# Patient Record
Sex: MALE | Race: WHITE | NOT HISPANIC OR LATINO | Employment: OTHER | URBAN - METROPOLITAN AREA
[De-identification: names, ages, dates, MRNs, and addresses within clinical notes are randomized per-mention and may not be internally consistent; named-entity substitution may affect disease eponyms.]

---

## 2017-02-09 ENCOUNTER — IMPORTED ENCOUNTER (OUTPATIENT)
Dept: URBAN - METROPOLITAN AREA CLINIC 43 | Facility: CLINIC | Age: 72
End: 2017-02-09

## 2017-02-09 PROBLEM — H16.223: Noted: 2017-02-09

## 2017-02-09 PROBLEM — H40.053: Noted: 2017-02-09

## 2017-06-15 ENCOUNTER — IMPORTED ENCOUNTER (OUTPATIENT)
Dept: URBAN - METROPOLITAN AREA CLINIC 43 | Facility: CLINIC | Age: 72
End: 2017-06-15

## 2017-06-15 PROBLEM — H43.813: Noted: 2017-06-15

## 2017-06-15 PROBLEM — H35.363: Noted: 2017-06-15

## 2017-06-15 PROBLEM — H40.053: Noted: 2017-06-15

## 2017-06-15 PROBLEM — E11.9: Noted: 2017-06-15

## 2017-06-28 ENCOUNTER — IMPORTED ENCOUNTER (OUTPATIENT)
Dept: URBAN - METROPOLITAN AREA CLINIC 43 | Facility: CLINIC | Age: 72
End: 2017-06-28

## 2019-01-04 ENCOUNTER — IMPORTED ENCOUNTER (OUTPATIENT)
Dept: URBAN - METROPOLITAN AREA CLINIC 43 | Facility: CLINIC | Age: 74
End: 2019-01-04

## 2019-01-04 PROBLEM — H40.053: Noted: 2019-01-04

## 2019-07-12 ENCOUNTER — IMPORTED ENCOUNTER (OUTPATIENT)
Dept: URBAN - METROPOLITAN AREA CLINIC 43 | Facility: CLINIC | Age: 74
End: 2019-07-12

## 2019-07-12 PROBLEM — H40.053: Noted: 2019-07-12

## 2020-04-19 ASSESSMENT — KERATOMETRY
OS_K1POWER_DIOPTERS: 41.75
OD_K2POWER_DIOPTERS: 41
OD_K1POWER_DIOPTERS: 41.5
OS_K2POWER_DIOPTERS: 41
OD_AXISANGLE_DEGREES: 165
OS_AXISANGLE2_DEGREES: 85
OD_K2POWER_DIOPTERS: 41.25
OS_AXISANGLE_DEGREES: 180
OD_AXISANGLE2_DEGREES: 75
OS_K2POWER_DIOPTERS: 41
OS_AXISANGLE_DEGREES: 175
OD_AXISANGLE2_DEGREES: 105
OS_K1POWER_DIOPTERS: 41.5
OS_AXISANGLE2_DEGREES: 90
OD_K1POWER_DIOPTERS: 41.25
OD_AXISANGLE_DEGREES: 15

## 2020-04-19 ASSESSMENT — TONOMETRY
OS_IOP_MMHG: 14.0
OD_IOP_MMHG: 20.0
OD_IOP_MMHG: 16.0
OD_IOP_MMHG: 14.0
OS_IOP_MMHG: 21.0
OD_IOP_MMHG: 15.0
OS_IOP_MMHG: 17.0
OS_IOP_MMHG: 25.0
OS_IOP_MMHG: 15.0

## 2020-04-19 ASSESSMENT — VISUAL ACUITY
OS_SC: J7
OS_SC: 20/25
OD_SC: J7
OD_SC: 20/30 +2
OD_SC: 20/20-1
OS_SC: 20/25
OD_SC: 20/20-2
OS_SC: 20/25 +2
OS_SC: 20/20-2
OD_SC: 20/25-1
OS_CC: J1+
OD_SC: 20/40 +1
OS_SC: 20/20-1
OS_SC: 20/25-2
OD_CC: J1+2

## 2021-11-10 ENCOUNTER — ESTABLISHED COMPREHENSIVE EXAM (OUTPATIENT)
Dept: URBAN - METROPOLITAN AREA CLINIC 32 | Facility: CLINIC | Age: 76
End: 2021-11-10

## 2021-11-10 DIAGNOSIS — H43.813: ICD-10-CM

## 2021-11-10 DIAGNOSIS — H35.3131: ICD-10-CM

## 2021-11-10 DIAGNOSIS — H40.053: ICD-10-CM

## 2021-11-10 DIAGNOSIS — E11.9: ICD-10-CM

## 2021-11-10 PROCEDURE — 92015 DETERMINE REFRACTIVE STATE: CPT

## 2021-11-10 PROCEDURE — 92133 CPTRZD OPH DX IMG PST SGM ON: CPT

## 2021-11-10 PROCEDURE — 92014 COMPRE OPH EXAM EST PT 1/>: CPT

## 2021-11-10 ASSESSMENT — VISUAL ACUITY
OS_GLARE: 20/50
OS_SC: J7
OS_SC: 20/20-1
OD_SC: 20/25-2
OS_CC: J1+
OD_SC: J5
OD_CC: J1+
OD_GLARE: 20/60

## 2021-11-10 ASSESSMENT — TONOMETRY
OS_IOP_MMHG: 14
OD_IOP_MMHG: 14

## 2021-11-10 ASSESSMENT — KERATOMETRY
OS_K1POWER_DIOPTERS: 41.00
OD_K1POWER_DIOPTERS: 41.50
OD_K2POWER_DIOPTERS: 41.25
OS_AXISANGLE2_DEGREES: 90
OD_AXISANGLE_DEGREES: 100
OS_K2POWER_DIOPTERS: 41.25
OS_AXISANGLE_DEGREES: 180
OD_AXISANGLE2_DEGREES: 10

## 2022-02-03 ENCOUNTER — NEW PATIENT (OUTPATIENT)
Dept: URBAN - METROPOLITAN AREA CLINIC 33 | Facility: CLINIC | Age: 77
End: 2022-02-03

## 2022-02-03 DIAGNOSIS — H35.3132: ICD-10-CM

## 2022-02-03 DIAGNOSIS — H43.813: ICD-10-CM

## 2022-02-03 DIAGNOSIS — H40.053: ICD-10-CM

## 2022-02-03 DIAGNOSIS — H04.129: ICD-10-CM

## 2022-02-03 DIAGNOSIS — H35.373: ICD-10-CM

## 2022-02-03 PROCEDURE — 99204 OFFICE O/P NEW MOD 45 MIN: CPT

## 2022-02-03 PROCEDURE — 92134 CPTRZ OPH DX IMG PST SGM RTA: CPT

## 2022-02-03 PROCEDURE — 92250 FUNDUS PHOTOGRAPHY W/I&R: CPT

## 2022-02-03 ASSESSMENT — VISUAL ACUITY
OD_SC: 20/40-2
OS_SC: 20/25-2

## 2022-02-03 ASSESSMENT — TONOMETRY
OD_IOP_MMHG: 15
OS_IOP_MMHG: 15

## 2022-06-04 ENCOUNTER — TELEPHONE ENCOUNTER (OUTPATIENT)
Dept: URBAN - METROPOLITAN AREA CLINIC 68 | Facility: CLINIC | Age: 77
End: 2022-06-04

## 2022-06-04 RX ORDER — POLYETHYLENE GLYCOL 3350, SODIUM SULFATE, SODIUM CHLORIDE, POTASSIUM CHLORIDE, ASCORBIC ACID, SODIUM ASCORBATE 7.5-2.691G
KIT ORAL AS DIRECTED
Qty: 1 | Refills: 0 | OUTPATIENT
Start: 2014-10-25 | End: 2014-10-26

## 2022-06-05 ENCOUNTER — TELEPHONE ENCOUNTER (OUTPATIENT)
Dept: URBAN - METROPOLITAN AREA CLINIC 68 | Facility: CLINIC | Age: 77
End: 2022-06-05

## 2022-06-05 RX ORDER — HYDROCORTISONE ACETATE 0.5 %
GLUCOSAMINE CHONDR 1500 COMPLX(  ORAL  DAILY ) ACTIVE -HX ENTRY CREAM (GRAM) TOPICAL DAILY
Status: ACTIVE | COMMUNITY
Start: 2014-10-22

## 2022-06-05 RX ORDER — CARVEDILOL 12.5 MG/1
CARVEDILOL( 12.5MG ORAL  TWO TIMES DAILY ) ACTIVE -HX ENTRY TABLET, FILM COATED ORAL
Status: ACTIVE | COMMUNITY
Start: 2014-10-22

## 2022-06-05 RX ORDER — POLYETHYLENE GLYCOL 3350 17 G/DOSE
MIRALAX(  ORAL  DAILY ) ACTIVE -HX ENTRY POWDER (GRAM) ORAL DAILY
Status: ACTIVE | COMMUNITY
Start: 2014-10-22

## 2022-06-05 RX ORDER — LEVOTHYROXINE SODIUM 0.03 MG/1
LEVOTHYROXINE SODIUM( 25MCG ORAL  DAILY ) ACTIVE -HX ENTRY TABLET ORAL DAILY
Status: ACTIVE | COMMUNITY
Start: 2014-10-22

## 2022-06-05 RX ORDER — GABAPENTIN 600 MG/1
GABAPENTIN( 600MG ORAL  FOUR TIMES DAILY ) ACTIVE -HX ENTRY TABLET, FILM COATED ORAL
Status: ACTIVE | COMMUNITY
Start: 2014-10-22

## 2022-06-05 RX ORDER — METFORMIN HYDROCHLORIDE 750 MG/1
METFORMIN HCL ER( 750MG ORAL  TWO TIMES DAILY ) ACTIVE -HX ENTRY TABLET, EXTENDED RELEASE ORAL
Status: ACTIVE | COMMUNITY
Start: 2014-10-22

## 2022-06-05 RX ORDER — SIMVASTATIN 20 MG/1
SIMVASTATIN( 20MG ORAL  DAILY ) ACTIVE -HX ENTRY TABLET, FILM COATED ORAL DAILY
Status: ACTIVE | COMMUNITY
Start: 2014-10-22

## 2022-06-05 RX ORDER — DOCUSATE SODIUM 100 MG/1
STOOL SOFTENER( 100MG ORAL 2 DAILY ) ACTIVE -HX ENTRY CAPSULE, LIQUID FILLED ORAL DAILY
Status: ACTIVE | COMMUNITY
Start: 2014-10-22

## 2022-06-05 RX ORDER — LOSARTAN POTASSIUM 25 MG/1
LOSARTAN POTASSIUM( 25MG ORAL  DAILY ) ACTIVE -HX ENTRY TABLET ORAL DAILY
Status: ACTIVE | COMMUNITY
Start: 2014-10-22

## 2022-06-25 ENCOUNTER — TELEPHONE ENCOUNTER (OUTPATIENT)
Age: 77
End: 2022-06-25

## 2022-06-25 RX ORDER — POLYETHYLENE GLYCOL 3350, SODIUM SULFATE, SODIUM CHLORIDE, POTASSIUM CHLORIDE, ASCORBIC ACID, SODIUM ASCORBATE 7.5-2.691G
KIT ORAL AS DIRECTED
Qty: 1 | Refills: 0 | OUTPATIENT
Start: 2014-10-25 | End: 2014-10-26

## 2022-06-26 ENCOUNTER — TELEPHONE ENCOUNTER (OUTPATIENT)
Age: 77
End: 2022-06-26

## 2022-06-26 RX ORDER — GABAPENTIN 600 MG/1
GABAPENTIN( 600MG ORAL  FOUR TIMES DAILY ) ACTIVE -HX ENTRY TABLET ORAL
Status: ACTIVE | COMMUNITY
Start: 2014-10-22

## 2022-06-26 RX ORDER — DOCUSATE SODIUM 100 MG/1
STOOL SOFTENER( 100MG ORAL 2 DAILY ) ACTIVE -HX ENTRY CAPSULE, LIQUID FILLED ORAL DAILY
Status: ACTIVE | COMMUNITY
Start: 2014-10-22

## 2022-06-26 RX ORDER — LEVOTHYROXINE SODIUM 25 UG/1
LEVOTHYROXINE SODIUM( 25MCG ORAL  DAILY ) ACTIVE -HX ENTRY TABLET ORAL DAILY
Status: ACTIVE | COMMUNITY
Start: 2014-10-22

## 2022-06-26 RX ORDER — LOSARTAN POTASSIUM 25 MG/1
LOSARTAN POTASSIUM( 25MG ORAL  DAILY ) ACTIVE -HX ENTRY TABLET, FILM COATED ORAL DAILY
Status: ACTIVE | COMMUNITY
Start: 2014-10-22

## 2022-06-26 RX ORDER — B-COMPLEX WITH VITAMIN C
VITAMIN B COMPLEX(  ORAL  DAILY ) ACTIVE -HX ENTRY TABLET ORAL DAILY
Status: ACTIVE | COMMUNITY
Start: 2014-10-22

## 2022-06-26 RX ORDER — LIRAGLUTIDE 6 MG/ML
VICTOZA( 18MG/3ML SUBCUTANEOUS  DAILY ) ACTIVE -HX ENTRY INJECTION SUBCUTANEOUS DAILY
Status: ACTIVE | COMMUNITY
Start: 2014-10-22

## 2022-06-26 RX ORDER — LORATADINE 5 MG
MIRALAX(  ORAL  DAILY ) ACTIVE -HX ENTRY TABLET,CHEWABLE ORAL DAILY
Status: ACTIVE | COMMUNITY
Start: 2014-10-22

## 2022-06-26 RX ORDER — CARVEDILOL 12.5 MG/1
CARVEDILOL( 12.5MG ORAL  TWO TIMES DAILY ) ACTIVE -HX ENTRY TABLET, FILM COATED ORAL
Status: ACTIVE | COMMUNITY
Start: 2014-10-22

## 2022-06-26 RX ORDER — SIMVASTATIN 20 MG/1
SIMVASTATIN( 20MG ORAL  DAILY ) ACTIVE -HX ENTRY TABLET, FILM COATED ORAL DAILY
Status: ACTIVE | COMMUNITY
Start: 2014-10-22

## 2022-06-26 RX ORDER — ASPIRIN 81 MG/1
ASPIRIN( 81MG ORAL  DAILY ) ACTIVE -HX ENTRY TABLET, DELAYED RELEASE ORAL DAILY
Status: ACTIVE | COMMUNITY
Start: 2014-10-22

## 2022-06-26 RX ORDER — METFORMIN HYDROCHLORIDE 750 MG/1
METFORMIN HCL ER( 750MG ORAL  TWO TIMES DAILY ) ACTIVE -HX ENTRY TABLET, EXTENDED RELEASE ORAL
Status: ACTIVE | COMMUNITY
Start: 2014-10-22

## 2022-06-26 RX ORDER — FLUPHENAZINE HCL 1 MG
GLUCOSAMINE CHONDR 1500 COMPLX(  ORAL  DAILY ) ACTIVE -HX ENTRY TABLET ORAL DAILY
Status: ACTIVE | COMMUNITY
Start: 2014-10-22

## 2022-09-08 ENCOUNTER — FOLLOW UP (OUTPATIENT)
Dept: URBAN - METROPOLITAN AREA CLINIC 33 | Facility: CLINIC | Age: 77
End: 2022-09-08

## 2022-09-08 VITALS — HEART RATE: 71 BPM | HEIGHT: 60 IN | SYSTOLIC BLOOD PRESSURE: 157 MMHG | DIASTOLIC BLOOD PRESSURE: 85 MMHG

## 2022-09-08 DIAGNOSIS — H35.3132: ICD-10-CM

## 2022-09-08 DIAGNOSIS — H43.813: ICD-10-CM

## 2022-09-08 DIAGNOSIS — H35.373: ICD-10-CM

## 2022-09-08 DIAGNOSIS — H04.123: ICD-10-CM

## 2022-09-08 DIAGNOSIS — Z79.4: ICD-10-CM

## 2022-09-08 DIAGNOSIS — E11.9: ICD-10-CM

## 2022-09-08 DIAGNOSIS — H40.053: ICD-10-CM

## 2022-09-08 PROCEDURE — 92235 FLUORESCEIN ANGRPH MLTIFRAME: CPT

## 2022-09-08 PROCEDURE — 92014 COMPRE OPH EXAM EST PT 1/>: CPT

## 2022-09-08 PROCEDURE — 92134 CPTRZ OPH DX IMG PST SGM RTA: CPT

## 2022-09-08 PROCEDURE — 92250 FUNDUS PHOTOGRAPHY W/I&R: CPT

## 2022-09-08 ASSESSMENT — VISUAL ACUITY
OS_SC: 20/25
OD_SC: 20/30-2

## 2022-09-08 ASSESSMENT — TONOMETRY
OS_IOP_MMHG: 18
OD_IOP_MMHG: 17

## 2022-11-16 ENCOUNTER — APPOINTMENT (RX ONLY)
Dept: URBAN - METROPOLITAN AREA CLINIC 129 | Facility: CLINIC | Age: 77
Setting detail: DERMATOLOGY
End: 2022-11-16

## 2022-11-16 DIAGNOSIS — L21.8 OTHER SEBORRHEIC DERMATITIS: ICD-10-CM | Status: INADEQUATELY CONTROLLED

## 2022-11-16 PROCEDURE — ? PRESCRIPTION

## 2022-11-16 PROCEDURE — 99204 OFFICE O/P NEW MOD 45 MIN: CPT

## 2022-11-16 PROCEDURE — ? COUNSELING

## 2022-11-16 PROCEDURE — ? PRESCRIPTION MEDICATION MANAGEMENT

## 2022-11-16 RX ORDER — KETOCONAZOLE 20 MG/G
1 CREAM TOPICAL BID
Qty: 60 | Refills: 2 | Status: ERX | COMMUNITY
Start: 2022-11-16

## 2022-11-16 RX ORDER — KETOCONAZOLE 20 MG/ML
PRN SHAMPOO, SUSPENSION TOPICAL BIW
Qty: 120 | Refills: 3 | Status: ERX | COMMUNITY
Start: 2022-11-16

## 2022-11-16 RX ADMIN — KETOCONAZOLE PRN: 20 SHAMPOO, SUSPENSION TOPICAL at 00:00

## 2022-11-16 RX ADMIN — KETOCONAZOLE 1: 20 CREAM TOPICAL at 00:00

## 2022-11-16 ASSESSMENT — LOCATION SIMPLE DESCRIPTION DERM
LOCATION SIMPLE: RIGHT EAR
LOCATION SIMPLE: SCALP
LOCATION SIMPLE: POSTERIOR SCALP
LOCATION SIMPLE: LEFT FOREHEAD

## 2022-11-16 ASSESSMENT — LOCATION DETAILED DESCRIPTION DERM
LOCATION DETAILED: RIGHT POSTERIOR EAR
LOCATION DETAILED: LEFT SUPERIOR FOREHEAD
LOCATION DETAILED: RIGHT CENTRAL PARIETAL SCALP
LOCATION DETAILED: LEFT POSTAURICULAR SKIN
LOCATION DETAILED: POSTERIOR MID-PARIETAL SCALP

## 2022-11-16 ASSESSMENT — LOCATION ZONE DERM
LOCATION ZONE: EAR
LOCATION ZONE: FACE
LOCATION ZONE: SCALP

## 2022-11-16 NOTE — HPI: SKIN LESION
What Type Of Note Output Would You Prefer (Optional)?: Bullet Format
How Severe Is Your Skin Lesion?: mild
Is This A New Presentation, Or A Follow-Up?: Skin Lesions
Additional History: Hx of prostate cancer

## 2022-11-16 NOTE — PROCEDURE: PRESCRIPTION MEDICATION MANAGEMENT
Discontinue Regimen: Triamcinolone cream, prn flares -prescribe from outside provider
Initiate Treatment: ketoconazole 2 % topical cream BID\\nQuantity: 60.0 g  Days Supply: 30\\nSig: Apply twice daily to affected area behind the ears x 3 weeks.  Repeat prn flares
Detail Level: Zone
Render In Strict Bullet Format?: No

## 2022-12-14 ENCOUNTER — APPOINTMENT (RX ONLY)
Dept: URBAN - METROPOLITAN AREA CLINIC 129 | Facility: CLINIC | Age: 77
Setting detail: DERMATOLOGY
End: 2022-12-14

## 2022-12-14 DIAGNOSIS — L21.8 OTHER SEBORRHEIC DERMATITIS: ICD-10-CM | Status: RESOLVING

## 2022-12-14 PROCEDURE — ? PRESCRIPTION

## 2022-12-14 PROCEDURE — ? COUNSELING

## 2022-12-14 PROCEDURE — ? PRESCRIPTION MEDICATION MANAGEMENT

## 2022-12-14 PROCEDURE — 99214 OFFICE O/P EST MOD 30 MIN: CPT

## 2022-12-14 RX ORDER — HYDROCORTISONE 25 MG/G
CREAM TOPICAL BID
Qty: 30 | Refills: 2 | Status: ERX | COMMUNITY
Start: 2022-12-14

## 2022-12-14 RX ADMIN — HYDROCORTISONE: 25 CREAM TOPICAL at 00:00

## 2022-12-14 ASSESSMENT — LOCATION SIMPLE DESCRIPTION DERM
LOCATION SIMPLE: LEFT EAR
LOCATION SIMPLE: RIGHT EAR

## 2022-12-14 ASSESSMENT — LOCATION DETAILED DESCRIPTION DERM
LOCATION DETAILED: LEFT CRUS OF HELIX
LOCATION DETAILED: RIGHT SUPERIOR HELIX
LOCATION DETAILED: RIGHT CAVUM CONCHA

## 2022-12-14 ASSESSMENT — LOCATION ZONE DERM: LOCATION ZONE: EAR

## 2022-12-14 NOTE — PROCEDURE: PRESCRIPTION MEDICATION MANAGEMENT
Continue Regimen: ketoconazole 2 % topical cream BID\\nQuantity: 60.0 g  Days Supply: 30\\nSig: Apply twice daily to affected area behind the ears x 3 weeks.  Repeat prn flares
Discontinue Regimen: Triamcinolone cream, prn flares -prescribe from outside provider
Initiate Treatment: Hydrocortisone 2.5% rotate it with the ketoconazole.
Detail Level: Zone
Render In Strict Bullet Format?: No

## 2023-01-12 ENCOUNTER — FOLLOW UP (OUTPATIENT)
Dept: URBAN - METROPOLITAN AREA CLINIC 33 | Facility: CLINIC | Age: 78
End: 2023-01-12

## 2023-01-12 VITALS
HEART RATE: 70 BPM | HEIGHT: 64 IN | DIASTOLIC BLOOD PRESSURE: 87 MMHG | SYSTOLIC BLOOD PRESSURE: 170 MMHG | BODY MASS INDEX: 45.93 KG/M2 | WEIGHT: 269 LBS

## 2023-01-12 DIAGNOSIS — H04.123: ICD-10-CM

## 2023-01-12 DIAGNOSIS — H35.373: ICD-10-CM

## 2023-01-12 DIAGNOSIS — Z79.4: ICD-10-CM

## 2023-01-12 DIAGNOSIS — H40.053: ICD-10-CM

## 2023-01-12 DIAGNOSIS — H35.62: ICD-10-CM

## 2023-01-12 DIAGNOSIS — H43.813: ICD-10-CM

## 2023-01-12 DIAGNOSIS — E11.9: ICD-10-CM

## 2023-01-12 DIAGNOSIS — H35.3132: ICD-10-CM

## 2023-01-12 PROCEDURE — 92250 FUNDUS PHOTOGRAPHY W/I&R: CPT

## 2023-01-12 PROCEDURE — 92014 COMPRE OPH EXAM EST PT 1/>: CPT

## 2023-01-12 PROCEDURE — 92134 CPTRZ OPH DX IMG PST SGM RTA: CPT

## 2023-01-12 PROCEDURE — 92235 FLUORESCEIN ANGRPH MLTIFRAME: CPT

## 2023-01-12 ASSESSMENT — VISUAL ACUITY
OS_SC: 20/25
OD_SC: 20/25+2

## 2023-01-12 ASSESSMENT — TONOMETRY
OS_IOP_MMHG: 14
OD_IOP_MMHG: 13

## 2023-01-18 ENCOUNTER — APPOINTMENT (RX ONLY)
Dept: URBAN - METROPOLITAN AREA CLINIC 129 | Facility: CLINIC | Age: 78
Setting detail: DERMATOLOGY
End: 2023-01-18

## 2023-01-18 DIAGNOSIS — L82.1 OTHER SEBORRHEIC KERATOSIS: ICD-10-CM

## 2023-01-18 DIAGNOSIS — Z71.89 OTHER SPECIFIED COUNSELING: ICD-10-CM

## 2023-01-18 DIAGNOSIS — D18.0 HEMANGIOMA: ICD-10-CM

## 2023-01-18 DIAGNOSIS — L57.0 ACTINIC KERATOSIS: ICD-10-CM

## 2023-01-18 DIAGNOSIS — D22 MELANOCYTIC NEVI: ICD-10-CM

## 2023-01-18 DIAGNOSIS — L81.4 OTHER MELANIN HYPERPIGMENTATION: ICD-10-CM

## 2023-01-18 DIAGNOSIS — D485 NEOPLASM OF UNCERTAIN BEHAVIOR OF SKIN: ICD-10-CM

## 2023-01-18 PROBLEM — D18.01 HEMANGIOMA OF SKIN AND SUBCUTANEOUS TISSUE: Status: ACTIVE | Noted: 2023-01-18

## 2023-01-18 PROBLEM — D48.5 NEOPLASM OF UNCERTAIN BEHAVIOR OF SKIN: Status: ACTIVE | Noted: 2023-01-18

## 2023-01-18 PROBLEM — D22.5 MELANOCYTIC NEVI OF TRUNK: Status: ACTIVE | Noted: 2023-01-18

## 2023-01-18 PROCEDURE — 17003 DESTRUCT PREMALG LES 2-14: CPT

## 2023-01-18 PROCEDURE — ? COUNSELING

## 2023-01-18 PROCEDURE — 99213 OFFICE O/P EST LOW 20 MIN: CPT | Mod: 25

## 2023-01-18 PROCEDURE — 11102 TANGNTL BX SKIN SINGLE LES: CPT

## 2023-01-18 PROCEDURE — ? LIQUID NITROGEN

## 2023-01-18 PROCEDURE — ? BIOPSY BY SHAVE METHOD

## 2023-01-18 PROCEDURE — 17000 DESTRUCT PREMALG LESION: CPT | Mod: 59

## 2023-01-18 ASSESSMENT — LOCATION SIMPLE DESCRIPTION DERM
LOCATION SIMPLE: RIGHT FOREARM
LOCATION SIMPLE: UPPER BACK
LOCATION SIMPLE: RIGHT POSTERIOR THIGH
LOCATION SIMPLE: RIGHT EAR

## 2023-01-18 ASSESSMENT — LOCATION ZONE DERM
LOCATION ZONE: TRUNK
LOCATION ZONE: LEG
LOCATION ZONE: EAR
LOCATION ZONE: ARM

## 2023-01-18 ASSESSMENT — LOCATION DETAILED DESCRIPTION DERM
LOCATION DETAILED: RIGHT PROXIMAL RADIAL DORSAL FOREARM
LOCATION DETAILED: RIGHT PROXIMAL POSTERIOR THIGH
LOCATION DETAILED: RIGHT TRIANGULAR FOSSA
LOCATION DETAILED: INFERIOR THORACIC SPINE

## 2023-01-18 NOTE — PROCEDURE: LIQUID NITROGEN
Application Tool (Optional): Liquid Nitrogen Sprayer
Show Aperture Variable?: Yes
Consent: The patient's consent was obtained including but not limited to risks of crusting, scabbing, blistering, scarring, darker or lighter pigmentary change, recurrence, incomplete removal and infection.
Render Post-Care Instructions In Note?: no
Number Of Freeze-Thaw Cycles: 1 freeze-thaw cycle
Detail Level: Detailed
Post-Care Instructions: I reviewed with the patient in detail post-care instructions. Patient is to wear sunprotection, and avoid picking at any of the treated lesions. Pt may apply Vaseline to crusted or scabbing areas.
Duration Of Freeze Thaw-Cycle (Seconds): 10

## 2023-01-18 NOTE — PROCEDURE: BIOPSY BY SHAVE METHOD
Detail Level: Detailed
Depth Of Biopsy: dermis
Was A Bandage Applied: Yes
Size Of Lesion In Cm: 1.5
Biopsy Type: H and E
Biopsy Method: Dermablade
Anesthesia Type: 1% lidocaine with epinephrine
Anesthesia Volume In Cc (Will Not Render If 0): 0.5
Additional Anesthesia Volume In Cc (Will Not Render If 0): 0
Hemostasis: Drysol
Wound Care: Petrolatum
Dressing: bandage
Destruction After The Procedure: No
Type Of Destruction Used: Curettage
Curettage Text: The wound bed was treated with curettage after the biopsy was performed.
Cryotherapy Text: The wound bed was treated with cryotherapy after the biopsy was performed.
Electrodesiccation Text: The wound bed was treated with electrodesiccation after the biopsy was performed.
Electrodesiccation And Curettage Text: The wound bed was treated with electrodesiccation and curettage after the biopsy was performed.
Silver Nitrate Text: The wound bed was treated with silver nitrate after the biopsy was performed.
Lab: [facility]
Lab Facility: [address] [facility]
Consent: Written consent was obtained and risks were reviewed including but not limited to scarring, infection, bleeding, scabbing, incomplete removal, nerve damage and allergy to anesthesia.
Post-Care Instructions: I reviewed with the patient in detail post-care instructions. Patient is to keep the biopsy site dry overnight, and then apply bacitracin twice daily until healed. Patient may apply hydrogen peroxide soaks to remove any crusting.
Notification Instructions: Patient will be notified of biopsy results. However, patient instructed to call the office if not contacted within 2 weeks.
Billing Type: [employer]
Information: Selecting Yes will display possible errors in your note based on the variables you have selected. This validation is only offered as a suggestion for you. PLEASE NOTE THAT THE VALIDATION TEXT WILL BE REMOVED WHEN YOU FINALIZE YOUR NOTE. IF YOU WANT TO FAX A PRELIMINARY NOTE YOU WILL NEED TO TOGGLE THIS TO 'NO' IF YOU DO NOT WANT IT IN YOUR FAXED NOTE.

## 2023-04-19 ENCOUNTER — RX ONLY (OUTPATIENT)
Age: 78
Setting detail: RX ONLY
End: 2023-04-19

## 2023-04-19 RX ORDER — HYDROCORTISONE 25 MG/G
1 CREAM TOPICAL 1
Qty: 30 | Refills: 5 | Status: ERX

## 2023-07-06 ENCOUNTER — COMPREHENSIVE EXAM (OUTPATIENT)
Dept: URBAN - METROPOLITAN AREA CLINIC 32 | Facility: CLINIC | Age: 78
End: 2023-07-06

## 2023-07-06 DIAGNOSIS — H35.3131: ICD-10-CM

## 2023-07-06 DIAGNOSIS — H43.813: ICD-10-CM

## 2023-07-06 DIAGNOSIS — E11.9: ICD-10-CM

## 2023-07-06 DIAGNOSIS — H40.053: ICD-10-CM

## 2023-07-06 DIAGNOSIS — Z96.1: ICD-10-CM

## 2023-07-06 PROCEDURE — 99214 OFFICE O/P EST MOD 30 MIN: CPT

## 2023-07-06 PROCEDURE — 92015 DETERMINE REFRACTIVE STATE: CPT

## 2023-07-06 PROCEDURE — 92250 FUNDUS PHOTOGRAPHY W/I&R: CPT

## 2023-07-06 ASSESSMENT — KERATOMETRY
OD_AXISANGLE_DEGREES: 100
OS_AXISANGLE_DEGREES: 94
OD_AXISANGLE2_DEGREES: 56
OD_K1POWER_DIOPTERS: 41.50
OD_K2POWER_DIOPTERS: 41.00
OS_AXISANGLE2_DEGREES: 90
OS_K1POWER_DIOPTERS: 41.25
OS_AXISANGLE2_DEGREES: 4
OS_K2POWER_DIOPTERS: 41.25
OS_K1POWER_DIOPTERS: 41.00
OD_K1POWER_DIOPTERS: 41.25
OD_K2POWER_DIOPTERS: 41.25
OS_K2POWER_DIOPTERS: 40.75
OD_AXISANGLE_DEGREES: 146
OD_AXISANGLE2_DEGREES: 10
OS_AXISANGLE_DEGREES: 180

## 2023-07-06 ASSESSMENT — VISUAL ACUITY
OS_CC: J1+/-2
OD_SC: 20/40-2
OD_CC: J1+/-2
OD_PH: 20/30-1
OS_SC: 20/25+2
OD_SC: J7
OS_SC: J7

## 2023-07-06 ASSESSMENT — TONOMETRY
OD_IOP_MMHG: 14
OS_IOP_MMHG: 11

## 2024-07-09 ENCOUNTER — COMPREHENSIVE EXAM (OUTPATIENT)
Dept: URBAN - METROPOLITAN AREA CLINIC 32 | Facility: CLINIC | Age: 79
End: 2024-07-09

## 2024-07-09 DIAGNOSIS — E11.9: ICD-10-CM

## 2024-07-09 DIAGNOSIS — H40.053: ICD-10-CM

## 2024-07-09 DIAGNOSIS — H35.3131: ICD-10-CM

## 2024-07-09 DIAGNOSIS — H04.123: ICD-10-CM

## 2024-07-09 DIAGNOSIS — H35.371: ICD-10-CM

## 2024-07-09 PROCEDURE — 92134 CPTRZ OPH DX IMG PST SGM RTA: CPT

## 2024-07-09 PROCEDURE — 99214 OFFICE O/P EST MOD 30 MIN: CPT

## 2024-07-09 ASSESSMENT — TONOMETRY
OD_IOP_MMHG: 17
OS_IOP_MMHG: 17

## 2024-07-09 ASSESSMENT — VISUAL ACUITY
OD_SC: 20/40-1
OS_SC: 20/25-2
OS_SC: J1+
OD_SC: J2+2

## 2024-07-09 ASSESSMENT — KERATOMETRY
OS_K2POWER_DIOPTERS: 41.00
OD_K2POWER_DIOPTERS: 41.25
OD_K1POWER_DIOPTERS: 41.75
OS_K1POWER_DIOPTERS: 41.25
OD_AXISANGLE2_DEGREES: 180
OS_AXISANGLE2_DEGREES: 170
OS_AXISANGLE_DEGREES: 80
OD_AXISANGLE_DEGREES: 90

## 2025-04-17 ENCOUNTER — COMPREHENSIVE EXAM (OUTPATIENT)
Age: 80
End: 2025-04-17

## 2025-04-17 VITALS — HEIGHT: 64 IN | WEIGHT: 230 LBS | BODY MASS INDEX: 39.27 KG/M2

## 2025-04-17 DIAGNOSIS — H40.053: ICD-10-CM

## 2025-04-17 DIAGNOSIS — H02.833: ICD-10-CM

## 2025-04-17 DIAGNOSIS — H35.373: ICD-10-CM

## 2025-04-17 DIAGNOSIS — E11.9: ICD-10-CM

## 2025-04-17 DIAGNOSIS — H35.62: ICD-10-CM

## 2025-04-17 DIAGNOSIS — H02.836: ICD-10-CM

## 2025-04-17 DIAGNOSIS — H35.3132: ICD-10-CM

## 2025-04-17 DIAGNOSIS — H04.123: ICD-10-CM

## 2025-04-17 DIAGNOSIS — Z79.4: ICD-10-CM

## 2025-04-17 DIAGNOSIS — H43.813: ICD-10-CM

## 2025-04-17 PROCEDURE — 92250 FUNDUS PHOTOGRAPHY W/I&R: CPT | Mod: 59

## 2025-04-17 PROCEDURE — 92014 COMPRE OPH EXAM EST PT 1/>: CPT

## 2025-04-17 PROCEDURE — 92134 CPTRZ OPH DX IMG PST SGM RTA: CPT

## 2025-06-17 ENCOUNTER — COMPREHENSIVE EXAM (OUTPATIENT)
Age: 80
End: 2025-06-17

## 2025-06-17 DIAGNOSIS — H35.3132: ICD-10-CM

## 2025-06-17 DIAGNOSIS — H43.813: ICD-10-CM

## 2025-06-17 DIAGNOSIS — H04.123: ICD-10-CM

## 2025-06-17 DIAGNOSIS — H35.371: ICD-10-CM

## 2025-06-17 DIAGNOSIS — E11.9: ICD-10-CM

## 2025-06-17 DIAGNOSIS — H40.053: ICD-10-CM

## 2025-06-17 PROCEDURE — 92250 FUNDUS PHOTOGRAPHY W/I&R: CPT

## 2025-06-17 PROCEDURE — 99214 OFFICE O/P EST MOD 30 MIN: CPT
